# Patient Record
Sex: FEMALE | Race: WHITE | NOT HISPANIC OR LATINO
[De-identification: names, ages, dates, MRNs, and addresses within clinical notes are randomized per-mention and may not be internally consistent; named-entity substitution may affect disease eponyms.]

---

## 2017-01-11 ENCOUNTER — RX ONLY (RX ONLY)
Age: 52
End: 2017-01-11

## 2021-02-23 ENCOUNTER — APPOINTMENT (OUTPATIENT)
Dept: URBAN - METROPOLITAN AREA CLINIC 186 | Age: 56
Setting detail: DERMATOLOGY
End: 2021-02-23

## 2021-02-23 VITALS — TEMPERATURE: 98.4 F

## 2021-02-23 DIAGNOSIS — Z41.9 ENCOUNTER FOR PROCEDURE FOR PURPOSES OTHER THAN REMEDYING HEALTH STATE, UNSPECIFIED: ICD-10-CM

## 2021-02-23 PROCEDURE — OTHER OTHER (COSMETIC): OTHER

## 2021-02-23 PROCEDURE — OTHER ADDITIONAL NOTES: OTHER

## 2021-02-23 PROCEDURE — OTHER MEDICAL CONSULTATION: FILLERS: OTHER

## 2021-02-23 PROCEDURE — OTHER MIPS QUALITY: OTHER

## 2021-02-23 PROCEDURE — OTHER PATIENT SPECIFIC COUNSELING: OTHER

## 2021-02-23 ASSESSMENT — LOCATION DETAILED DESCRIPTION DERM
LOCATION DETAILED: RIGHT MEDIAL BUCCAL CHEEK
LOCATION DETAILED: LEFT CENTRAL BUCCAL CHEEK
LOCATION DETAILED: LEFT INFERIOR MEDIAL MALAR CHEEK
LOCATION DETAILED: LEFT SUPERIOR CENTRAL BUCCAL CHEEK
LOCATION DETAILED: LEFT CENTRAL MALAR CHEEK
LOCATION DETAILED: RIGHT CENTRAL MALAR CHEEK
LOCATION DETAILED: RIGHT MEDIAL FOREHEAD
LOCATION DETAILED: RIGHT INFERIOR CENTRAL MALAR CHEEK
LOCATION DETAILED: RIGHT INFERIOR MEDIAL MALAR CHEEK

## 2021-02-23 ASSESSMENT — LOCATION SIMPLE DESCRIPTION DERM
LOCATION SIMPLE: RIGHT CHEEK
LOCATION SIMPLE: RIGHT FOREHEAD
LOCATION SIMPLE: LEFT CHEEK

## 2021-02-23 ASSESSMENT — LOCATION ZONE DERM: LOCATION ZONE: FACE

## 2021-02-23 NOTE — PROCEDURE: ADDITIONAL NOTES
Render Risk Assessment In Note?: no
Additional Notes: Recommended Juvederm Voluma, Ultra and Ultra Plus.\\nPatient is aware to postpone filler treatment 3 weeks before and after dental procedures and live vaccinations. \\nCounseled patient on cannula.
Detail Level: Simple
Additional Notes: Moderate mid face volume loss.  I suggested we use juvederm ultra plus/ Voluma ( patient’s request) for mid face volume restoration, and juvederm for nasolabial folds and marionette volume depleted areas.  Patient ‘s areas of concern are nasolabial folds and prejowl areas.

## 2022-07-26 ENCOUNTER — APPOINTMENT (OUTPATIENT)
Dept: URBAN - METROPOLITAN AREA CLINIC 186 | Age: 57
Setting detail: DERMATOLOGY
End: 2022-07-26

## 2022-07-26 DIAGNOSIS — L738 OTHER SPECIFIED DISEASES OF HAIR AND HAIR FOLLICLES: ICD-10-CM

## 2022-07-26 DIAGNOSIS — L663 OTHER SPECIFIED DISEASES OF HAIR AND HAIR FOLLICLES: ICD-10-CM

## 2022-07-26 DIAGNOSIS — L20.84 INTRINSIC (ALLERGIC) ECZEMA: ICD-10-CM

## 2022-07-26 DIAGNOSIS — R21 RASH AND OTHER NONSPECIFIC SKIN ERUPTION: ICD-10-CM

## 2022-07-26 PROBLEM — L02.92 FURUNCLE, UNSPECIFIED: Status: ACTIVE | Noted: 2022-07-26

## 2022-07-26 PROCEDURE — OTHER TREATMENT REGIMEN: OTHER

## 2022-07-26 PROCEDURE — OTHER PRESCRIPTION MEDICATION MANAGEMENT: OTHER

## 2022-07-26 PROCEDURE — OTHER PRESCRIPTION: OTHER

## 2022-07-26 PROCEDURE — OTHER DIAGNOSIS COMMENT: OTHER

## 2022-07-26 PROCEDURE — 99213 OFFICE O/P EST LOW 20 MIN: CPT

## 2022-07-26 PROCEDURE — OTHER EDUCATIONAL RESOURCES PROVIDED: OTHER

## 2022-07-26 PROCEDURE — OTHER ADDITIONAL NOTES: OTHER

## 2022-07-26 PROCEDURE — OTHER COUNSELING: OTHER

## 2022-07-26 RX ORDER — TRIAMCINOLONE ACETONIDE 1 MG/G
CREAM TOPICAL BID
Qty: 80 | Refills: 3 | Status: ERX | COMMUNITY
Start: 2022-07-26

## 2022-07-26 RX ORDER — CLINDAMYCIN PHOSPHATE AND BENZOYL PEROXIDE 10; 37.5 MG/G; MG/G
GEL TOPICAL
Qty: 50 | Refills: 3 | Status: ERX | COMMUNITY
Start: 2022-07-26

## 2022-07-26 ASSESSMENT — LOCATION ZONE DERM: LOCATION ZONE: TRUNK

## 2022-07-26 ASSESSMENT — LOCATION DETAILED DESCRIPTION DERM: LOCATION DETAILED: RIGHT INFERIOR MEDIAL UPPER BACK

## 2022-07-26 ASSESSMENT — LOCATION SIMPLE DESCRIPTION DERM: LOCATION SIMPLE: RIGHT UPPER BACK

## 2022-07-26 NOTE — PROCEDURE: ADDITIONAL NOTES
Additional Notes: Educated on the need to see active rash and biopsy for appropriate dx
Detail Level: Simple
Render Risk Assessment In Note?: no

## 2022-07-26 NOTE — PROCEDURE: DIAGNOSIS COMMENT
Comment: Per patient post booster rash that has been recurrent every 1-2 weeks appear on face, and thighs. states this is pruritic. photos are suggestive of acneiform lesions.  I suggested treatment with onexton cream. revisit and consider punch bx if these recur.
Render Risk Assessment In Note?: no
Detail Level: Simple

## 2022-07-26 NOTE — HPI: RASH
What Type Of Note Output Would You Prefer (Optional)?: Bullet Format
How Severe Is Your Rash?: mild
Is This A New Presentation, Or A Follow-Up?: Rash
Additional History: Patient had Covid booster shot end of December. “Blister, like rash” appeared Mid January. Primary care physician believes this is Covid booster related.\\nRash is not really present at this time

## 2022-07-26 NOTE — PROCEDURE: PRESCRIPTION MEDICATION MANAGEMENT
Initiate Treatment: Onexton daily to areas of breakout
Detail Level: Zone
Render In Strict Bullet Format?: No
Initiate Treatment: Triamcinolone

## 2022-08-11 ENCOUNTER — RX ONLY (RX ONLY)
Age: 57
End: 2022-08-11

## 2022-08-11 RX ORDER — CLINDAMYCIN PHOSPHATE AND BENZOYL PEROXIDE 10; 37.5 MG/G; MG/G
GEL TOPICAL
Qty: 50 | Refills: 3 | Status: ERX

## 2024-11-14 ENCOUNTER — OFFICE VISIT (OUTPATIENT)
Dept: URGENT CARE | Age: 59
End: 2024-11-14
Payer: COMMERCIAL

## 2024-11-14 VITALS
SYSTOLIC BLOOD PRESSURE: 120 MMHG | HEART RATE: 77 BPM | RESPIRATION RATE: 20 BRPM | TEMPERATURE: 98.2 F | OXYGEN SATURATION: 98 % | DIASTOLIC BLOOD PRESSURE: 87 MMHG

## 2024-11-14 DIAGNOSIS — B30.9 VIRAL CONJUNCTIVITIS OF LEFT EYE: Primary | ICD-10-CM

## 2024-11-14 PROCEDURE — 99203 OFFICE O/P NEW LOW 30 MIN: CPT | Performed by: NURSE PRACTITIONER

## 2024-11-14 RX ORDER — AZELASTINE HYDROCHLORIDE 0.5 MG/ML
1 SOLUTION/ DROPS OPHTHALMIC 2 TIMES DAILY
Qty: 6 ML | Refills: 0 | Status: SHIPPED | OUTPATIENT
Start: 2024-11-14 | End: 2025-11-14

## 2024-11-14 ASSESSMENT — ENCOUNTER SYMPTOMS
EYE INFLAMMATION: 0
TINGLING: 0
HEADACHES: 0
NAUSEA: 0
EYE ITCHING: 1
CRUSTING: 0
BLURRED VISION: 0
PERI-ORBITAL EDEMA: 0
EYE DISCHARGE: 0
BLIND SPOTS: 0
DOUBLE VISION: 0
NUMBNESS: 0
EYE PAIN: 1
EYE WATERING: 0
VOMITING: 0
PHOTOPHOBIA: 0
EYE REDNESS: 1
WEAKNESS: 0

## 2024-11-14 ASSESSMENT — VISUAL ACUITY: OU: 1

## 2024-11-14 NOTE — PROGRESS NOTES
Subjective   Patient ID: Mariana Bowser is a 59 y.o. female. They present today with a chief complaint of Eye Problem (Red and irritated fo several days).    History of Present Illness    History provided by:  Patient   used: No    Eye Problem  Location:  Left eye  Quality:  Aching  Severity:  Mild  Onset quality:  Gradual  Duration:  3 days  Timing:  Constant  Progression:  Unchanged  Chronicity:  New  Context: not burn, not chemical exposure, not contact lens problem, not direct trauma, not foreign body, not using machinery, not scratch, not smoke exposure and not UV exposure    Relieved by:  Nothing  Worsened by:  Nothing  Ineffective treatments: OTC homeopathic eye drops.  Associated symptoms: itching and redness    Associated symptoms: no blurred vision, no crusting, no decreased vision, no discharge, no double vision, no facial rash, no headaches, no inflammation, no nausea, no numbness, no photophobia, no scotomas, no swelling, no tearing, no tingling, no vomiting and no weakness    Associated symptoms comment:  Nasal congestion and clear nasal discharge      Past Medical History  Allergies as of 11/14/2024 - Reviewed 11/14/2024   Allergen Reaction Noted    Erythromycin GI Upset and GI intolerance 08/01/2012       (Not in a hospital admission)       No past medical history on file.    No past surgical history on file.         Review of Systems  Review of Systems   Eyes:  Positive for pain, redness and itching. Negative for blurred vision, double vision, photophobia, discharge and visual disturbance.   Gastrointestinal:  Negative for nausea and vomiting.   Neurological:  Negative for tingling, weakness, numbness and headaches.                                  Objective    Vitals:    11/14/24 0828   BP: 120/87   Pulse: 77   Resp: 20   Temp: 36.8 °C (98.2 °F)   SpO2: 98%     No LMP recorded.    Physical Exam  Vitals and nursing note reviewed.   Constitutional:       Appearance: Normal  appearance.   HENT:      Head: Normocephalic and atraumatic.      Right Ear: Hearing, tympanic membrane, ear canal and external ear normal.      Left Ear: Hearing, tympanic membrane, ear canal and external ear normal.      Nose: Mucosal edema present. No nasal deformity, septal deviation, signs of injury, laceration, nasal tenderness, congestion or rhinorrhea.      Right Sinus: No maxillary sinus tenderness or frontal sinus tenderness.      Left Sinus: No maxillary sinus tenderness or frontal sinus tenderness.      Mouth/Throat:      Lips: Pink.      Mouth: Mucous membranes are moist.      Pharynx: Oropharynx is clear. Uvula midline.      Tonsils: No tonsillar exudate or tonsillar abscesses.   Eyes:      General: Lids are normal. Vision grossly intact. Gaze aligned appropriately. No visual field deficit.        Right eye: No foreign body or discharge.         Left eye: No foreign body or discharge.      Extraocular Movements: Extraocular movements intact.      Right eye: Normal extraocular motion.      Left eye: Normal extraocular motion.      Conjunctiva/sclera:      Right eye: Right conjunctiva is not injected. No chemosis, exudate or hemorrhage.     Left eye: Left conjunctiva is injected. No chemosis, exudate or hemorrhage.     Comments: Visual acuity 20/20 OD, 20/25 OS and 20/20 OU.     Cardiovascular:      Rate and Rhythm: Normal rate and regular rhythm.      Heart sounds: Normal heart sounds.   Pulmonary:      Effort: Pulmonary effort is normal.      Breath sounds: Normal breath sounds and air entry.   Musculoskeletal:      Cervical back: Normal range of motion and neck supple.   Lymphadenopathy:      Cervical: No cervical adenopathy.   Neurological:      Mental Status: She is alert.   Psychiatric:         Mood and Affect: Mood normal.         Behavior: Behavior normal.         Procedures    Point of Care Test & Imaging Results from this visit  No results found for this visit on 11/14/24.   No results  found.    Diagnostic study results (if any) were reviewed by MARIA DE JESUS Robles.    Assessment/Plan   Allergies, medications, history, and pertinent labs/EKGs/Imaging reviewed by MARIA DE JESUS Robles.     Medical Decision Making  Use the eyedrops as instructed. Symptoms should improve in 2 to 3 days.  Take Tylenol and/or ibuprofen as needed for aches and pain and/or fever.  Follow with your eye care specialist as needed.  Call 911 or go to the nearest emergency room if symptoms became severe such as severe eye pain, trouble seeing, fever of 102.5 degrees Fahrenheit or 39.2 degrees celsius.  Patient verbalized understanding of the instructions and left in stable condition.     Orders and Diagnoses  Diagnoses and all orders for this visit:  Viral conjunctivitis of left eye  -     azelastine (Optivar) 0.05 % ophthalmic solution; Administer 1 drop into the left eye 2 times a day.      Medical Admin Record      Patient disposition: Home    Electronically signed by MARIA DE JESUS Robles  8:43 AM